# Patient Record
Sex: FEMALE | Race: WHITE | NOT HISPANIC OR LATINO
[De-identification: names, ages, dates, MRNs, and addresses within clinical notes are randomized per-mention and may not be internally consistent; named-entity substitution may affect disease eponyms.]

---

## 2017-02-13 ENCOUNTER — APPOINTMENT (OUTPATIENT)
Dept: RHEUMATOLOGY | Facility: CLINIC | Age: 57
End: 2017-02-13

## 2017-04-10 ENCOUNTER — APPOINTMENT (OUTPATIENT)
Dept: RHEUMATOLOGY | Facility: CLINIC | Age: 57
End: 2017-04-10

## 2017-06-05 ENCOUNTER — APPOINTMENT (OUTPATIENT)
Dept: RHEUMATOLOGY | Facility: CLINIC | Age: 57
End: 2017-06-05

## 2017-08-02 ENCOUNTER — APPOINTMENT (OUTPATIENT)
Dept: RHEUMATOLOGY | Facility: CLINIC | Age: 57
End: 2017-08-02
Payer: MEDICARE

## 2017-08-02 PROCEDURE — 96413 CHEMO IV INFUSION 1 HR: CPT

## 2017-08-02 PROCEDURE — 96415 CHEMO IV INFUSION ADDL HR: CPT

## 2017-09-25 ENCOUNTER — APPOINTMENT (OUTPATIENT)
Dept: RHEUMATOLOGY | Facility: CLINIC | Age: 57
End: 2017-09-25

## 2017-10-30 ENCOUNTER — APPOINTMENT (OUTPATIENT)
Dept: OTOLARYNGOLOGY | Facility: CLINIC | Age: 57
End: 2017-10-30

## 2017-11-20 ENCOUNTER — APPOINTMENT (OUTPATIENT)
Dept: RHEUMATOLOGY | Facility: CLINIC | Age: 57
End: 2017-11-20

## 2018-04-18 ENCOUNTER — APPOINTMENT (OUTPATIENT)
Dept: CARDIOLOGY | Facility: CLINIC | Age: 58
End: 2018-04-18
Payer: MEDICARE

## 2018-04-18 ENCOUNTER — NON-APPOINTMENT (OUTPATIENT)
Age: 58
End: 2018-04-18

## 2018-04-18 VITALS
RESPIRATION RATE: 15 BRPM | DIASTOLIC BLOOD PRESSURE: 86 MMHG | OXYGEN SATURATION: 96 % | TEMPERATURE: 97.9 F | HEART RATE: 85 BPM | SYSTOLIC BLOOD PRESSURE: 155 MMHG | HEIGHT: 63 IN | WEIGHT: 160 LBS | BODY MASS INDEX: 28.35 KG/M2

## 2018-04-18 DIAGNOSIS — Z87.2 PERSONAL HISTORY OF DISEASES OF THE SKIN AND SUBCUTANEOUS TISSUE: ICD-10-CM

## 2018-04-18 DIAGNOSIS — R94.31 ABNORMAL ELECTROCARDIOGRAM [ECG] [EKG]: ICD-10-CM

## 2018-04-18 DIAGNOSIS — R06.02 SHORTNESS OF BREATH: ICD-10-CM

## 2018-04-18 PROCEDURE — 93306 TTE W/DOPPLER COMPLETE: CPT

## 2018-04-18 PROCEDURE — 99204 OFFICE O/P NEW MOD 45 MIN: CPT

## 2018-04-18 RX ORDER — MUPIROCIN 20 MG/G
2 OINTMENT TOPICAL
Qty: 22 | Refills: 0 | Status: DISCONTINUED | COMMUNITY
Start: 2017-08-25

## 2018-04-18 RX ORDER — PREDNISONE 50 MG/1
50 TABLET ORAL
Qty: 4 | Refills: 0 | Status: DISCONTINUED | COMMUNITY
Start: 2017-08-27

## 2018-04-18 RX ORDER — DOXYCYCLINE HYCLATE 100 MG/1
100 TABLET ORAL
Qty: 60 | Refills: 0 | Status: DISCONTINUED | COMMUNITY
Start: 2017-08-25

## 2020-07-03 ENCOUNTER — RESULT REVIEW (OUTPATIENT)
Age: 60
End: 2020-07-03

## 2021-04-12 ENCOUNTER — APPOINTMENT (OUTPATIENT)
Dept: DISASTER EMERGENCY | Facility: OTHER | Age: 61
End: 2021-04-12
Payer: MEDICARE

## 2021-04-12 PROCEDURE — 0012A: CPT

## 2021-10-19 ENCOUNTER — APPOINTMENT (OUTPATIENT)
Dept: GASTROENTEROLOGY | Facility: CLINIC | Age: 61
End: 2021-10-19
Payer: MEDICARE

## 2021-10-29 ENCOUNTER — LABORATORY RESULT (OUTPATIENT)
Age: 61
End: 2021-10-29

## 2021-10-29 ENCOUNTER — APPOINTMENT (OUTPATIENT)
Dept: GASTROENTEROLOGY | Facility: CLINIC | Age: 61
End: 2021-10-29
Payer: MEDICARE

## 2021-10-29 VITALS — HEIGHT: 63 IN | HEART RATE: 78 BPM | RESPIRATION RATE: 14 BRPM | WEIGHT: 152 LBS | BODY MASS INDEX: 26.93 KG/M2

## 2021-10-29 PROCEDURE — 99214 OFFICE O/P EST MOD 30 MIN: CPT | Mod: 25

## 2021-10-29 PROCEDURE — 36415 COLL VENOUS BLD VENIPUNCTURE: CPT

## 2021-10-29 NOTE — ASSESSMENT
[FreeTextEntry1] : 61-year-old female with Crohn's colitis with rectovaginal fistula and stricture in the sigmoid colon for which she had previously refused dilation and/or surgery.  She is been doing reasonably well with her conservative treatment.  She has occasional air through her vagina.  Some diarrheal episode  may be due to gastroenteritis versus flare in the Crohn's colitis.  We will assess with both laboratory test and fecal calprotectin advised her to maintain the mesalamine for tablets a day.

## 2021-10-29 NOTE — HISTORY OF PRESENT ILLNESS
[___ Month(s) Ago] : [unfilled] month(s) ago [Heartburn] : denies heartburn [Nausea] : denies nausea [Vomiting] : denies vomiting [Diarrhea] : improved diarrhea [Constipation] : denies constipation [Yellow Skin Or Eyes (Jaundice)] : denies jaundice [Abdominal Pain] : denies abdominal pain [Abdominal Swelling] : denies abdominal swelling [Rectal Pain] : denies rectal pain [Inflammatory Bowel Disease] : inflammatory bowel disease [Wt Gain ___ Lbs] : no recent weight gain [GERD] : no gastroesophageal reflux disease [Hiatus Hernia] : no hiatus hernia [Peptic Ulcer Disease] : no peptic ulcer disease [Pancreatitis] : no pancreatitis [Cholelithiasis] : no cholelithiasis [Kidney Stone] : no kidney stone [Irritable Bowel Syndrome] : no irritable bowel syndrome [Diverticulitis] : no diverticulitis [Alcohol Abuse] : no alcohol abuse [Malignancy] : no malignancy [Abdominal Surgery] : no abdominal surgery [Appendectomy] : no appendectomy [Cholecystectomy] : no cholecystectomy [de-identified] : 61-year-old female with complicated history of ulcerative colitis with sigmoid stricture and colovaginal fistula.  She had a collagen plug placed several years ago and had not followed up after this.  She still has occasional air via the vagina.  She states that she was in her usual state of good health until a few weeks ago when she developed some diarrhea that she thought was gastroenteritis.  Her primary care doctor prescribed dicyclomine as well as omeprazole.  She has relatively normal bowel movements.  Received covid vaccine.  Recently began mesalamine again.  We discussed the need for stool testing and laboratory testing.

## 2021-10-29 NOTE — CONSULT LETTER
[Dear  ___] : Dear  [unfilled], [Courtesy Letter:] : I had the pleasure of seeing your patient, [unfilled], in my office today. [Please see my note below.] : Please see my note below. [Consult Closing:] : Thank you very much for allowing me to participate in the care of this patient.  If you have any questions, please do not hesitate to contact me. [Sincerely,] : Sincerely, [FreeTextEntry3] : Ronaldo Box MD, FACP, AGAF, FAASLD\par  of Medicine\par [facility]\par

## 2021-11-01 LAB
ALBUMIN SERPL ELPH-MCNC: 3.9 G/DL
ALP BLD-CCNC: 105 U/L
ALT SERPL-CCNC: 9 U/L
ANION GAP SERPL CALC-SCNC: 14 MMOL/L
AST SERPL-CCNC: 14 U/L
BILIRUB SERPL-MCNC: <0.2 MG/DL
BUN SERPL-MCNC: 9 MG/DL
CALCIUM SERPL-MCNC: 9 MG/DL
CHLORIDE SERPL-SCNC: 105 MMOL/L
CO2 SERPL-SCNC: 21 MMOL/L
CREAT SERPL-MCNC: 0.89 MG/DL
CRP SERPL-MCNC: 18 MG/L
FERRITIN SERPL-MCNC: 23 NG/ML
FOLATE SERPL-MCNC: 6.5 NG/ML
GLUCOSE SERPL-MCNC: 101 MG/DL
IRON SATN MFR SERPL: 7 %
IRON SERPL-MCNC: 23 UG/DL
POTASSIUM SERPL-SCNC: 3.9 MMOL/L
PROT SERPL-MCNC: 7.3 G/DL
SODIUM SERPL-SCNC: 140 MMOL/L
TIBC SERPL-MCNC: 311 UG/DL
TRANSFERRIN SERPL-MCNC: 261 MG/DL
UIBC SERPL-MCNC: 288 UG/DL
VIT B12 SERPL-MCNC: >2000 PG/ML

## 2021-12-26 ENCOUNTER — RX CHANGE (OUTPATIENT)
Age: 61
End: 2021-12-26

## 2021-12-26 RX ORDER — MESALAMINE 1.2 G/1
1.2 TABLET, DELAYED RELEASE ORAL DAILY
Qty: 120 | Refills: 3 | Status: DISCONTINUED | COMMUNITY
Start: 2021-10-26 | End: 2021-12-26

## 2022-01-02 ENCOUNTER — NON-APPOINTMENT (OUTPATIENT)
Age: 62
End: 2022-01-02

## 2022-01-26 ENCOUNTER — RX CHANGE (OUTPATIENT)
Age: 62
End: 2022-01-26

## 2022-01-26 RX ORDER — MESALAMINE 1.2 G/1
1.2 TABLET, DELAYED RELEASE ORAL
Qty: 360 | Refills: 2 | Status: DISCONTINUED | COMMUNITY
Start: 2021-12-26 | End: 2022-01-26

## 2023-11-13 RX ORDER — MESALAMINE 1.2 G/1
1.2 TABLET, DELAYED RELEASE ORAL
Qty: 360 | Refills: 2 | Status: ACTIVE | COMMUNITY
Start: 2022-01-26 | End: 1900-01-01

## 2024-03-04 ENCOUNTER — APPOINTMENT (OUTPATIENT)
Dept: GASTROENTEROLOGY | Facility: CLINIC | Age: 64
End: 2024-03-04
Payer: MEDICARE

## 2024-03-04 VITALS
SYSTOLIC BLOOD PRESSURE: 138 MMHG | RESPIRATION RATE: 14 BRPM | HEART RATE: 64 BPM | WEIGHT: 142 LBS | DIASTOLIC BLOOD PRESSURE: 80 MMHG | TEMPERATURE: 97 F | HEIGHT: 63 IN | OXYGEN SATURATION: 98 % | BODY MASS INDEX: 25.16 KG/M2

## 2024-03-04 DIAGNOSIS — K62.89 OTHER SPECIFIED DISEASES OF ANUS AND RECTUM: ICD-10-CM

## 2024-03-04 DIAGNOSIS — E78.00 PURE HYPERCHOLESTEROLEMIA, UNSPECIFIED: ICD-10-CM

## 2024-03-04 DIAGNOSIS — K50.10 CROHN'S DISEASE OF LARGE INTESTINE W/OUT COMPLICATIONS: ICD-10-CM

## 2024-03-04 DIAGNOSIS — N82.4 OTHER FEMALE INTESTINAL-GENITAL TRACT FISTULAE: ICD-10-CM

## 2024-03-04 PROCEDURE — 36415 COLL VENOUS BLD VENIPUNCTURE: CPT

## 2024-03-04 PROCEDURE — 99214 OFFICE O/P EST MOD 30 MIN: CPT | Mod: 25

## 2024-03-04 NOTE — REASON FOR VISIT
[Follow-up] : a follow-up of an existing diagnosis [FreeTextEntry1] : colitis, Recto vaginal fistula

## 2024-03-04 NOTE — CONSULT LETTER
[Dear  ___] : Dear  [unfilled], [Courtesy Letter:] : I had the pleasure of seeing your patient, [unfilled], in my office today. [Please see my note below.] : Please see my note below. [Consult Closing:] : Thank you very much for allowing me to participate in the care of this patient.  If you have any questions, please do not hesitate to contact me. [Sincerely,] : Sincerely, [FreeTextEntry3] : Ronaldo Box MD, FACP, AGAF, FAASLD\par   of Medicine\par  [facility]\par

## 2024-03-04 NOTE — ASSESSMENT
[FreeTextEntry1] : 63-year-old female with complicated history of ulcerative colitis with sigmoid stricture and colovaginal fistula.  She had a collagen plug placed several years ago and had not followed up after this.  She still has occasional air via the vagina.  She states that she was in her usual state of good health until a few weeks ago when she developed some diarrhea that she thought was gastroenteritis.  Her primary care doctor prescribed dicyclomine as well as omeprazole.  She has relatively normal bowel movements.  Received covid vaccine.  Recently began mesalamine again.  We discussed the need for stool testing and laboratory testing.  RTO 3/4/24- requires FMLA filled out for her . Staets constipation improved. STill has air via vagina  due to known rectovaginal fistula. Due cat scan and elective colonoscopy, latter she has been reluctant to schedule.  IMP: 1. colitis 2. Rectovaginal fistula, known  PLAN: 1. cat scan abd/pelvis iv/oral 2. cbc, cmp, crp cea 3. Elective colonoscopy after above 4. Maintain mesalamine

## 2024-03-04 NOTE — REVIEW OF SYSTEMS
[As Noted in HPI] : as noted in HPI [Diarrhea] : diarrhea [Recent Weight Loss (___ Lbs)] : recent [unfilled] ~Ulb weight loss [Negative] : Heme/Lymph

## 2024-03-04 NOTE — HISTORY OF PRESENT ILLNESS
[___ Month(s) Ago] : [unfilled] month(s) ago [Heartburn] : denies heartburn [Nausea] : denies nausea [Vomiting] : denies vomiting [Diarrhea] : improved diarrhea [Constipation] : denies constipation [Yellow Skin Or Eyes (Jaundice)] : denies jaundice [Abdominal Pain] : denies abdominal pain [Abdominal Swelling] : denies abdominal swelling [Rectal Pain] : denies rectal pain [Inflammatory Bowel Disease] : inflammatory bowel disease [Wt Gain ___ Lbs] : no recent weight gain [GERD] : no gastroesophageal reflux disease [Hiatus Hernia] : no hiatus hernia [Pancreatitis] : no pancreatitis [Peptic Ulcer Disease] : no peptic ulcer disease [Cholelithiasis] : no cholelithiasis [Kidney Stone] : no kidney stone [Irritable Bowel Syndrome] : no irritable bowel syndrome [Diverticulitis] : no diverticulitis [Alcohol Abuse] : no alcohol abuse [Malignancy] : no malignancy [Abdominal Surgery] : no abdominal surgery [Appendectomy] : no appendectomy [Cholecystectomy] : no cholecystectomy [FreeTextEntry1] : 63-year-old female with complicated history of ulcerative colitis with sigmoid stricture and colovaginal fistula.  She had a collagen plug placed several years ago and had not followed up after this.  She still has occasional air via the vagina.  She states that she was in her usual state of good health until a few weeks ago when she developed some diarrhea that she thought was gastroenteritis.  Her primary care doctor prescribed dicyclomine as well as omeprazole.  She has relatively normal bowel movements.  Received covid vaccine.  Recently began mesalamine again.  We discussed the need for stool testing and laboratory testing.  RTO 3/4/24- requires FMLA filled out for her . Staets constipation improved. STill has air via vagina  due to known rectovaginal fistula. Due cat scan and elective colonoscopy, latter she has been reluctant to schedule.

## 2024-03-04 NOTE — PHYSICAL EXAM
[General Appearance - In No Acute Distress] : in no acute distress [General Appearance - Alert] : alert [PERRL With Normal Accommodation] : pupils were equal in size, round, and reactive to light [Extraocular Movements] : extraocular movements were intact [Outer Ear] : the ears and nose were normal in appearance [Neck Cervical Mass (___cm)] : no neck mass was observed [Neck Appearance] : the appearance of the neck was normal [Thyroid Diffuse Enlargement] : the thyroid was not enlarged [Jugular Venous Distention Increased] : there was no jugular-venous distention [Thyroid Nodule] : there were no palpable thyroid nodules [Heart Sounds] : normal S1 and S2 [Heart Sounds Gallop] : no gallops [Heart Sounds Pericardial Friction Rub] : no pericardial rub [Full Pulse] : the pedal pulses are present [Edema] : there was no peripheral edema [Abdomen Mass (___ Cm)] : no abdominal mass palpated [Cervical Lymph Nodes Enlarged Posterior Bilaterally] : posterior cervical [Supraclavicular Lymph Nodes Enlarged Bilaterally] : supraclavicular [Cervical Lymph Nodes Enlarged Anterior Bilaterally] : anterior cervical [Axillary Lymph Nodes Enlarged Bilaterally] : axillary [Inguinal Lymph Nodes Enlarged Bilaterally] : inguinal [Femoral Lymph Nodes Enlarged Bilaterally] : femoral [No Spinal Tenderness] : no spinal tenderness [No CVA Tenderness] : no ~M costovertebral angle tenderness [Nail Clubbing] : no clubbing  or cyanosis of the fingernails [Abnormal Walk] : normal gait [Motor Tone] : muscle strength and tone were normal [Musculoskeletal - Swelling] : no joint swelling seen [Skin Turgor] : normal skin turgor [Skin Color & Pigmentation] : normal skin color and pigmentation [Affect] : the affect was normal [Impaired Insight] : insight and judgment were intact [Alert] : alert [Normal Voice/Communication] : normal voice/communication [Healthy Appearing] : healthy appearing [No Acute Distress] : no acute distress [Sclera] : the sclera and conjunctiva were normal [Hearing Threshold Finger Rub Not [place]] : hearing was normal [Normal Lips/Gums] : the lips and gums were normal [Oropharynx] : the oropharynx was normal [No Neck Mass] : no neck mass was observed [Normal Appearance] : the appearance of the neck was normal [No Respiratory Distress] : no respiratory distress [Respiration, Rhythm And Depth] : normal respiratory rhythm and effort [No Acc Muscle Use] : no accessory muscle use [Auscultation Breath Sounds / Voice Sounds] : lungs were clear to auscultation bilaterally [Heart Rate And Rhythm] : heart rate was normal and rhythm regular [Normal S1, S2] : normal S1 and S2 [Murmurs] : no murmurs [Bowel Sounds] : normal bowel sounds [Abdomen Tenderness] : non-tender [Abdomen Soft] : soft [No Masses] : no abdominal mass palpated [] : no hepatosplenomegaly [Oriented To Time, Place, And Person] : oriented to person, place, and time

## 2024-03-05 LAB
ALBUMIN SERPL ELPH-MCNC: 4.1 G/DL
ALP BLD-CCNC: 102 U/L
ALT SERPL-CCNC: 20 U/L
ANION GAP SERPL CALC-SCNC: 14 MMOL/L
AST SERPL-CCNC: 25 U/L
BASOPHILS # BLD AUTO: 0.07 K/UL
BASOPHILS NFR BLD AUTO: 1.2 %
BILIRUB SERPL-MCNC: 0.2 MG/DL
BUN SERPL-MCNC: 16 MG/DL
CALCIUM SERPL-MCNC: 9.1 MG/DL
CEA SERPL-MCNC: 0.7 NG/ML
CHLORIDE SERPL-SCNC: 107 MMOL/L
CO2 SERPL-SCNC: 22 MMOL/L
CREAT SERPL-MCNC: 0.97 MG/DL
CRP SERPL-MCNC: <3 MG/L
EGFR: 65 ML/MIN/1.73M2
EOSINOPHIL # BLD AUTO: 0.65 K/UL
EOSINOPHIL NFR BLD AUTO: 11.2 %
FERRITIN SERPL-MCNC: 22 NG/ML
GLUCOSE SERPL-MCNC: 121 MG/DL
HCT VFR BLD CALC: 41.3 %
HGB BLD-MCNC: 13.1 G/DL
IMM GRANULOCYTES NFR BLD AUTO: 0.2 %
IRON SATN MFR SERPL: 15 %
IRON SERPL-MCNC: 54 UG/DL
LYMPHOCYTES # BLD AUTO: 2 K/UL
LYMPHOCYTES NFR BLD AUTO: 34.5 %
MAN DIFF?: NORMAL
MCHC RBC-ENTMCNC: 28.5 PG
MCHC RBC-ENTMCNC: 31.7 GM/DL
MCV RBC AUTO: 90 FL
MONOCYTES # BLD AUTO: 0.41 K/UL
MONOCYTES NFR BLD AUTO: 7.1 %
NEUTROPHILS # BLD AUTO: 2.66 K/UL
NEUTROPHILS NFR BLD AUTO: 45.8 %
PLATELET # BLD AUTO: 230 K/UL
POTASSIUM SERPL-SCNC: 3.8 MMOL/L
PROT SERPL-MCNC: 7 G/DL
RBC # BLD: 4.59 M/UL
RBC # FLD: 13.3 %
SODIUM SERPL-SCNC: 142 MMOL/L
TIBC SERPL-MCNC: 355 UG/DL
UIBC SERPL-MCNC: 302 UG/DL
WBC # FLD AUTO: 5.8 K/UL

## 2024-03-29 ENCOUNTER — NON-APPOINTMENT (OUTPATIENT)
Age: 64
End: 2024-03-29

## 2024-08-05 ENCOUNTER — RX RENEWAL (OUTPATIENT)
Age: 64
End: 2024-08-05

## 2025-03-31 ENCOUNTER — APPOINTMENT (OUTPATIENT)
Dept: GASTROENTEROLOGY | Facility: CLINIC | Age: 65
End: 2025-03-31
Payer: MEDICARE

## 2025-03-31 VITALS
BODY MASS INDEX: 25.16 KG/M2 | RESPIRATION RATE: 14 BRPM | DIASTOLIC BLOOD PRESSURE: 70 MMHG | HEIGHT: 63 IN | OXYGEN SATURATION: 98 % | WEIGHT: 142 LBS | SYSTOLIC BLOOD PRESSURE: 128 MMHG | HEART RATE: 77 BPM

## 2025-03-31 DIAGNOSIS — N82.4 OTHER FEMALE INTESTINAL-GENITAL TRACT FISTULAE: ICD-10-CM

## 2025-03-31 DIAGNOSIS — K62.89 OTHER SPECIFIED DISEASES OF ANUS AND RECTUM: ICD-10-CM

## 2025-03-31 PROCEDURE — 36415 COLL VENOUS BLD VENIPUNCTURE: CPT

## 2025-03-31 PROCEDURE — 99213 OFFICE O/P EST LOW 20 MIN: CPT

## 2025-04-01 LAB
ALBUMIN SERPL ELPH-MCNC: 4.1 G/DL
ALP BLD-CCNC: 112 U/L
ALT SERPL-CCNC: 14 U/L
ANION GAP SERPL CALC-SCNC: 12 MMOL/L
AST SERPL-CCNC: 20 U/L
BASOPHILS # BLD AUTO: 0.07 K/UL
BASOPHILS NFR BLD AUTO: 1.1 %
BILIRUB SERPL-MCNC: 0.3 MG/DL
BUN SERPL-MCNC: 12 MG/DL
CALCIUM SERPL-MCNC: 9.2 MG/DL
CHLORIDE SERPL-SCNC: 103 MMOL/L
CHOLEST SERPL-MCNC: 247 MG/DL
CO2 SERPL-SCNC: 24 MMOL/L
CREAT SERPL-MCNC: 0.97 MG/DL
CRP SERPL-MCNC: 5 MG/L
EGFRCR SERPLBLD CKD-EPI 2021: 64 ML/MIN/1.73M2
EOSINOPHIL # BLD AUTO: 0.57 K/UL
EOSINOPHIL NFR BLD AUTO: 8.8 %
FERRITIN SERPL-MCNC: 23 NG/ML
FOLATE SERPL-MCNC: 8.5 NG/ML
GLUCOSE SERPL-MCNC: 102 MG/DL
HCT VFR BLD CALC: 43.5 %
HDLC SERPL-MCNC: 47 MG/DL
HGB BLD-MCNC: 13.2 G/DL
IMM GRANULOCYTES NFR BLD AUTO: 0.2 %
IRON SATN MFR SERPL: 13 %
IRON SERPL-MCNC: 44 UG/DL
LDLC SERPL-MCNC: 146 MG/DL
LYMPHOCYTES # BLD AUTO: 1.75 K/UL
LYMPHOCYTES NFR BLD AUTO: 27 %
MAN DIFF?: NORMAL
MCHC RBC-ENTMCNC: 28.5 PG
MCHC RBC-ENTMCNC: 30.3 G/DL
MCV RBC AUTO: 94 FL
MONOCYTES # BLD AUTO: 0.46 K/UL
MONOCYTES NFR BLD AUTO: 7.1 %
NEUTROPHILS # BLD AUTO: 3.63 K/UL
NEUTROPHILS NFR BLD AUTO: 55.8 %
NONHDLC SERPL-MCNC: 200 MG/DL
PLATELET # BLD AUTO: 265 K/UL
POTASSIUM SERPL-SCNC: 4.1 MMOL/L
PROT SERPL-MCNC: 7.2 G/DL
RBC # BLD: 4.63 M/UL
RBC # FLD: 13.5 %
SODIUM SERPL-SCNC: 140 MMOL/L
TIBC SERPL-MCNC: 342 UG/DL
TRIGL SERPL-MCNC: 298 MG/DL
UIBC SERPL-MCNC: 299 UG/DL
VIT B12 SERPL-MCNC: 1376 PG/ML
WBC # FLD AUTO: 6.49 K/UL